# Patient Record
Sex: FEMALE | Race: WHITE | NOT HISPANIC OR LATINO | URBAN - METROPOLITAN AREA
[De-identification: names, ages, dates, MRNs, and addresses within clinical notes are randomized per-mention and may not be internally consistent; named-entity substitution may affect disease eponyms.]

---

## 2023-11-30 ENCOUNTER — CLAIMS CREATED FROM THE CLAIM WINDOW (OUTPATIENT)
Dept: URBAN - METROPOLITAN AREA CLINIC 111 | Facility: CLINIC | Age: 53
End: 2023-11-30
Payer: COMMERCIAL

## 2023-11-30 ENCOUNTER — DASHBOARD ENCOUNTERS (OUTPATIENT)
Age: 53
End: 2023-11-30

## 2023-11-30 VITALS
WEIGHT: 153 LBS | HEART RATE: 66 BPM | BODY MASS INDEX: 27.11 KG/M2 | HEIGHT: 63 IN | DIASTOLIC BLOOD PRESSURE: 77 MMHG | TEMPERATURE: 97.9 F | SYSTOLIC BLOOD PRESSURE: 144 MMHG

## 2023-11-30 DIAGNOSIS — R10.30 LOWER ABDOMINAL PAIN: ICD-10-CM

## 2023-11-30 DIAGNOSIS — N85.8 UTERINE MASS: ICD-10-CM

## 2023-11-30 DIAGNOSIS — K59.01 CONSTIPATION: ICD-10-CM

## 2023-11-30 DIAGNOSIS — K59.00 CONSTIPATION, UNSPECIFIED CONSTIPATION TYPE: ICD-10-CM

## 2023-11-30 PROBLEM — 14760008: Status: ACTIVE | Noted: 2023-11-30

## 2023-11-30 PROCEDURE — 99203 OFFICE O/P NEW LOW 30 MIN: CPT | Performed by: NURSE PRACTITIONER

## 2023-11-30 NOTE — HPI-TODAY'S VISIT:
52 yo female presents for bowel habit change. Reports having low abd discomfort and constipation. She has urges to have bowel movement but having sm pieces of stool. Seen pcp for this. CT a/p on 11/17/23 revealed tiny hiatal hernia, large amount of retained colonic stool, and lower uterine mass. She is scheduled to US tomorrow. She tried Dulcolax and MiraLax without relief, took mag citrate with relief. Denies fever, nausea, vomiting, blood in stool or weight loss. Last colonoscopy 12/6/21 normal per patient. Has fh of paternal grandmother colon cancer at age 61.

## 2024-01-03 ENCOUNTER — OFFICE VISIT (OUTPATIENT)
Dept: URBAN - METROPOLITAN AREA CLINIC 23 | Facility: CLINIC | Age: 54
End: 2024-01-03